# Patient Record
Sex: FEMALE | Race: WHITE | URBAN - METROPOLITAN AREA
[De-identification: names, ages, dates, MRNs, and addresses within clinical notes are randomized per-mention and may not be internally consistent; named-entity substitution may affect disease eponyms.]

---

## 2020-08-24 ENCOUNTER — OFFICE VISIT (OUTPATIENT)
Dept: URBAN - METROPOLITAN AREA CLINIC 54 | Facility: CLINIC | Age: 75
End: 2020-08-24
Payer: MEDICARE

## 2020-08-24 DIAGNOSIS — K74.60 CIRRHOSIS: ICD-10-CM

## 2020-08-24 DIAGNOSIS — K76.0 NAFLD (NONALCOHOLIC FATTY LIVER DISEASE): ICD-10-CM

## 2020-08-24 DIAGNOSIS — E88.81 METABOLIC SYNDROME: ICD-10-CM

## 2020-08-24 PROCEDURE — 99214 OFFICE O/P EST MOD 30 MIN: CPT | Performed by: INTERNAL MEDICINE

## 2020-08-24 RX ORDER — METOPROLOL SUCCINATE 100 MG/1
TABLET, EXTENDED RELEASE ORAL
Qty: 0 | Refills: 0 | Status: ACTIVE | COMMUNITY
Start: 2017-09-06

## 2020-08-24 RX ORDER — AMLODIPINE BESYLATE 5 MG/1
TABLET ORAL
Qty: 0 | Refills: 0 | Status: ACTIVE | COMMUNITY
Start: 2017-07-24

## 2020-08-24 RX ORDER — CHLORTHALIDONE 25 MG/1
TABLET ORAL
Qty: 0 | Refills: 0 | Status: ACTIVE | COMMUNITY
Start: 2017-08-18

## 2020-08-24 NOTE — HPI-TODAY'S VISIT:
Patient is a 76 yo woman who presents for transition of care. She has established NAFLD Cirrhosis since 2017 based on liver biopsy, imaging and lab profile. She has compensated disease without portal hypertension. She denies signs/symptoms of advanced liver disease. She has DM, HTN, HLD. Obesity and the metabolic syndrome. Also has CKD-III. MRI Aug 2020 confirms cirrhosis without HCC or PVT. Last MELD was 9. She has never been evalauted for OLT due to age. No tobacco or alcohol abuse. Last EGD was 2017 with no varices. Her last colonoscopy was done at [facility] around 5 years with no polyps removed. No family history of GI malignancies.

## 2021-02-22 ENCOUNTER — OFFICE VISIT (OUTPATIENT)
Dept: URBAN - METROPOLITAN AREA CLINIC 54 | Facility: CLINIC | Age: 76
End: 2021-02-22

## 2021-02-22 RX ORDER — METOPROLOL SUCCINATE 100 MG/1
TABLET, EXTENDED RELEASE ORAL
Qty: 0 | Refills: 0 | Status: ACTIVE | COMMUNITY
Start: 2017-09-06

## 2021-02-22 RX ORDER — CHLORTHALIDONE 25 MG/1
TABLET ORAL
Qty: 0 | Refills: 0 | Status: ACTIVE | COMMUNITY
Start: 2017-08-18

## 2021-02-22 RX ORDER — AMLODIPINE BESYLATE 5 MG/1
TABLET ORAL
Qty: 0 | Refills: 0 | Status: ACTIVE | COMMUNITY
Start: 2017-07-24

## 2021-04-01 ENCOUNTER — OFFICE VISIT (OUTPATIENT)
Dept: URBAN - METROPOLITAN AREA CLINIC 54 | Facility: CLINIC | Age: 76
End: 2021-04-01
Payer: MEDICARE

## 2021-04-01 ENCOUNTER — TELEPHONE ENCOUNTER (OUTPATIENT)
Dept: URBAN - METROPOLITAN AREA CLINIC 54 | Facility: CLINIC | Age: 76
End: 2021-04-01

## 2021-04-01 DIAGNOSIS — N18.9 CKD (CHRONIC KIDNEY DISEASE): ICD-10-CM

## 2021-04-01 DIAGNOSIS — E88.81 METABOLIC SYNDROME: ICD-10-CM

## 2021-04-01 DIAGNOSIS — K74.60: ICD-10-CM

## 2021-04-01 DIAGNOSIS — K75.81 NASH (NONALCOHOLIC STEATOHEPATITIS): ICD-10-CM

## 2021-04-01 DIAGNOSIS — E66.9 OBESITY (BMI 30-39.9): ICD-10-CM

## 2021-04-01 PROCEDURE — 99213 OFFICE O/P EST LOW 20 MIN: CPT | Performed by: INTERNAL MEDICINE

## 2021-04-01 RX ORDER — AMLODIPINE BESYLATE 5 MG/1
TABLET ORAL
Qty: 0 | Refills: 0 | Status: ACTIVE | COMMUNITY
Start: 2017-07-24

## 2021-04-01 RX ORDER — METOPROLOL SUCCINATE 100 MG/1
TABLET, EXTENDED RELEASE ORAL
Qty: 0 | Refills: 0 | Status: ACTIVE | COMMUNITY
Start: 2017-09-06

## 2021-04-01 RX ORDER — CHLORTHALIDONE 25 MG/1
TABLET ORAL
Qty: 0 | Refills: 0 | Status: ACTIVE | COMMUNITY
Start: 2017-08-18

## 2021-04-01 RX ORDER — CLONIDINE HYDROCHLORIDE 0.1 MG/1
1 TABLET TABLET ORAL BID
Qty: 60 TABLET | OUTPATIENT
Start: 2021-04-01

## 2021-04-01 NOTE — HPI-TODAY'S VISIT:
Patient is a 76 yo woman who presents for transition of care. She has established NAFLD Cirrhosis since 2017 based on liver biopsy, imaging and lab profile. She has compensated disease without portal hypertension. She denies signs/symptoms of advanced liver disease. She has DM, HTN, HLD. Obesity and the metabolic syndrome. Also has CKD-III. MRI Aug 2020 confirms cirrhosis without HCC or PVT. Last MELD was 9. She has never been evalauted for OLT due to age. No tobacco or alcohol abuse. Last EGD was 2017 with no varices. Her last colonoscopy was done at [facility] around 5 years with no polyps removed. No family history of GI malignancies.  Follow Up 4/1/21: Pateint presents for routine follow up. No liver related complaints over past 6 months. She is euvolemic with no signs of declining liver function. 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 Schaffer. She is due for HCC Screen. LFT's normal at last check. DM is poorly controlled. She reports a last HBA1C was in 7% range as per range.

## 2021-04-02 LAB
A/G RATIO: 1.7
AFP, SERUM, TUMOR MARKER: 8.3
ALBUMIN: 4
ALKALINE PHOSPHATASE: 62
ALT (SGPT): 14
AST (SGOT): 28
BASO (ABSOLUTE): 0
BASOS: 1
BILIRUBIN, TOTAL: 0.5
BUN/CREATININE RATIO: 13
BUN: 27
CALCIUM: 9.2
CARBON DIOXIDE, TOTAL: 23
CHLORIDE: 102
CREATININE: 2.09
EGFR IF AFRICN AM: 26
EGFR IF NONAFRICN AM: 22
EOS (ABSOLUTE): 0.2
EOS: 4
GLOBULIN, TOTAL: 2.3
GLUCOSE: 214
HEMATOCRIT: 29.3
HEMATOLOGY COMMENTS:: (no result)
HEMOGLOBIN: 9.3
IMMATURE CELLS: (no result)
IMMATURE GRANS (ABS): 0
IMMATURE GRANULOCYTES: 0
INR: 1.1
LYMPHS (ABSOLUTE): 1.8
LYMPHS: 32
MCH: 27.5
MCHC: 31.7
MCV: 87
MONOCYTES(ABSOLUTE): 0.5
MONOCYTES: 8
NEUTROPHILS (ABSOLUTE): 3.2
NEUTROPHILS: 55
NRBC: (no result)
PLATELETS: 176
POTASSIUM: 3.8
PROTEIN, TOTAL: 6.3
PROTHROMBIN TIME: 12
RBC: 3.38
RDW: 13.6
SODIUM: 138
WBC: 5.8

## 2021-04-09 ENCOUNTER — OFFICE VISIT (OUTPATIENT)
Dept: URBAN - METROPOLITAN AREA CLINIC 53 | Facility: CLINIC | Age: 76
End: 2021-04-09
Payer: MEDICARE

## 2021-04-09 DIAGNOSIS — N28.1 RIGHT RENAL CYST: ICD-10-CM

## 2021-04-09 DIAGNOSIS — K75.81 NASH (NONALCOHOLIC STEATOHEPATITIS): ICD-10-CM

## 2021-04-09 DIAGNOSIS — K74.69 CIRRHOSIS, CRYPTOGENIC: ICD-10-CM

## 2021-04-09 PROCEDURE — 76705 ECHO EXAM OF ABDOMEN: CPT | Performed by: INTERNAL MEDICINE

## 2021-04-09 PROCEDURE — 93975 VASCULAR STUDY: CPT | Performed by: INTERNAL MEDICINE

## 2021-04-09 RX ORDER — CLONIDINE HYDROCHLORIDE 0.1 MG/1
1 TABLET TABLET ORAL BID
Qty: 60 TABLET | Status: ACTIVE | COMMUNITY
Start: 2021-04-01

## 2021-04-09 RX ORDER — CHLORTHALIDONE 25 MG/1
TABLET ORAL
Qty: 0 | Refills: 0 | Status: ACTIVE | COMMUNITY
Start: 2017-08-18

## 2021-04-09 RX ORDER — METOPROLOL SUCCINATE 100 MG/1
TABLET, EXTENDED RELEASE ORAL
Qty: 0 | Refills: 0 | Status: ACTIVE | COMMUNITY
Start: 2017-09-06

## 2021-04-09 RX ORDER — AMLODIPINE BESYLATE 5 MG/1
TABLET ORAL
Qty: 0 | Refills: 0 | Status: ACTIVE | COMMUNITY
Start: 2017-07-24

## 2021-09-09 ENCOUNTER — OFFICE VISIT (OUTPATIENT)
Dept: URBAN - METROPOLITAN AREA CLINIC 54 | Facility: CLINIC | Age: 76
End: 2021-09-09

## 2021-09-09 RX ORDER — CHLORTHALIDONE 25 MG/1
TABLET ORAL
Qty: 0 | Refills: 0 | Status: ACTIVE | COMMUNITY
Start: 2017-08-18

## 2021-09-09 RX ORDER — METOPROLOL SUCCINATE 100 MG/1
TABLET, EXTENDED RELEASE ORAL
Qty: 0 | Refills: 0 | Status: ACTIVE | COMMUNITY
Start: 2017-09-06

## 2021-09-09 RX ORDER — AMLODIPINE BESYLATE 5 MG/1
TABLET ORAL
Qty: 0 | Refills: 0 | Status: ACTIVE | COMMUNITY
Start: 2017-07-24

## 2021-09-09 RX ORDER — CLONIDINE HYDROCHLORIDE 0.1 MG/1
1 TABLET TABLET ORAL BID
Qty: 60 TABLET | Status: ACTIVE | COMMUNITY
Start: 2021-04-01

## 2021-11-19 ENCOUNTER — OFFICE VISIT (OUTPATIENT)
Dept: URBAN - METROPOLITAN AREA CLINIC 54 | Facility: CLINIC | Age: 76
End: 2021-11-19
Payer: MEDICARE

## 2021-11-19 ENCOUNTER — WEB ENCOUNTER (OUTPATIENT)
Dept: URBAN - METROPOLITAN AREA CLINIC 54 | Facility: CLINIC | Age: 76
End: 2021-11-19

## 2021-11-19 DIAGNOSIS — D64.9 ANEMIA, UNSPECIFIED TYPE: ICD-10-CM

## 2021-11-19 DIAGNOSIS — E66.9 OBESITY (BMI 30-39.9): ICD-10-CM

## 2021-11-19 DIAGNOSIS — E88.81 METABOLIC SYNDROME: ICD-10-CM

## 2021-11-19 DIAGNOSIS — N18.9 CKD (CHRONIC KIDNEY DISEASE): ICD-10-CM

## 2021-11-19 DIAGNOSIS — K74.60: ICD-10-CM

## 2021-11-19 DIAGNOSIS — K75.81 NASH (NONALCOHOLIC STEATOHEPATITIS): ICD-10-CM

## 2021-11-19 PROCEDURE — 99213 OFFICE O/P EST LOW 20 MIN: CPT | Performed by: INTERNAL MEDICINE

## 2021-11-19 RX ORDER — METOPROLOL SUCCINATE 100 MG/1
TABLET, EXTENDED RELEASE ORAL
Qty: 0 | Refills: 0 | Status: ACTIVE | COMMUNITY
Start: 2017-09-06

## 2021-11-19 RX ORDER — ROSUVASTATIN CALCIUM 40 MG/1
1 TABLET TABLET, FILM COATED ORAL ONCE A DAY
Refills: 0 | Status: ACTIVE | COMMUNITY
Start: 1900-01-01

## 2021-11-19 RX ORDER — VALSARTAN 160 MG/1
1 TABLET TABLET, FILM COATED ORAL TWICE A DAY
Refills: 0 | Status: DISCONTINUED | COMMUNITY
Start: 1900-01-01

## 2021-11-19 RX ORDER — CLONIDINE HYDROCHLORIDE 0.1 MG/1
1 TABLET TABLET ORAL BID
Qty: 60 TABLET | Status: ACTIVE | COMMUNITY
Start: 2021-04-01

## 2021-11-19 RX ORDER — AMLODIPINE BESYLATE 5 MG/1
TABLET ORAL
Qty: 0 | Refills: 0 | Status: ACTIVE | COMMUNITY
Start: 2017-07-24

## 2021-11-19 RX ORDER — CHLORTHALIDONE 25 MG/1
TABLET ORAL
Qty: 0 | Refills: 0 | Status: ACTIVE | COMMUNITY
Start: 2017-08-18

## 2021-11-19 NOTE — PHYSICAL EXAM NEUROLOGIC:
Spoke with patient and she stated that she is uneligible for the moderna and pfizer covid vaccine per her pain doctor. She wanted Dr. Abdalla's advise as if she can take the J&J vaccine as she has hx of blood clots. Forwarded message to Dr. Abdalla. Informed pt that will call back with his recommendations. Pt verbalized understanding.    oriented to person, place and time , normal sensation , short and long term memory intact

## 2021-11-19 NOTE — HPI-TODAY'S VISIT:
Patient is a 76 yo woman who presents for transition of care. She has established NAFLD Cirrhosis since 2017 based on liver biopsy, imaging and lab profile. She has compensated disease without portal hypertension. She denies signs/symptoms of advanced liver disease. She has DM, HTN, HLD. Obesity and the metabolic syndrome. Also has CKD-III. MRI Aug 2020 confirms cirrhosis without HCC or PVT. Last MELD was 9. She has never been evalauted for OLT due to age. No tobacco or alcohol abuse. Last EGD was 2017 with no varices. Her last colonoscopy was done at [facility] around 5 years with no polyps removed. No family history of GI malignancies.  Follow Up 4/1/21: Pateint presents for routine follow up. No liver related complaints over past 6 months. She is euvolemic with no signs of declining liver function. 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 Schaffer. She is due for HCC Screen. LFT's normal at last check. DM is poorly controlled. She reports a last HBA1C was in 7% range as per range.  Follow Up 11/19/21: Patient presents for routine follow up. MELD noted at 14 on last draw (driven by Cr). No liver related complaints. Hb noted at 9.3. She was given iron by primary MD which improved it to 12.1. She appears euvolemic with no signs of GIB or PSE. Appetite and sleep are good. HCC screen negative April 2021.

## 2021-11-20 LAB
A/G RATIO: 2
AFP, SERUM, TUMOR MARKER: 10.5
ALBUMIN: 4.6
ALKALINE PHOSPHATASE: 94
ALT (SGPT): 25
AST (SGOT): 34
BASO (ABSOLUTE): 0.1
BASOS: 1
BILIRUBIN, TOTAL: 0.8
BUN/CREATININE RATIO: 11
BUN: 16
CALCIUM: 9.8
CARBON DIOXIDE, TOTAL: 24
CHLORIDE: 103
CREATININE: 1.43
EGFR IF AFRICN AM: 41
EGFR IF NONAFRICN AM: 35
EOS (ABSOLUTE): 0.2
EOS: 3
FERRITIN, SERUM: 89
GLOBULIN, TOTAL: 2.3
GLUCOSE: 179
HEMATOCRIT: 40.7
HEMATOLOGY COMMENTS:: (no result)
HEMOGLOBIN A1C: 6.7
HEMOGLOBIN: 13.1
IMMATURE CELLS: (no result)
IMMATURE GRANS (ABS): 0
IMMATURE GRANULOCYTES: 0
INR: 1
IRON BIND.CAP.(TIBC): 321
IRON SATURATION: 26
IRON: 84
LYMPHS (ABSOLUTE): 2
LYMPHS: 34
MCH: 29
MCHC: 32.2
MCV: 90
MONOCYTES(ABSOLUTE): 0.4
MONOCYTES: 7
NEUTROPHILS (ABSOLUTE): 3.3
NEUTROPHILS: 55
NRBC: (no result)
PLATELETS: 186
POTASSIUM: 4.2
PROTEIN, TOTAL: 6.9
PROTHROMBIN TIME: 10.9
RBC: 4.52
RDW: 13.1
SODIUM: 139
UIBC: 237
WBC: 6

## 2021-11-22 ENCOUNTER — TELEPHONE ENCOUNTER (OUTPATIENT)
Dept: URBAN - METROPOLITAN AREA CLINIC 92 | Facility: CLINIC | Age: 76
End: 2021-11-22

## 2021-11-22 ENCOUNTER — TELEPHONE ENCOUNTER (OUTPATIENT)
Dept: URBAN - METROPOLITAN AREA CLINIC 54 | Facility: CLINIC | Age: 76
End: 2021-11-22

## 2021-12-01 ENCOUNTER — OFFICE VISIT (OUTPATIENT)
Dept: URBAN - METROPOLITAN AREA CLINIC 53 | Facility: CLINIC | Age: 76
End: 2021-12-01

## 2022-05-20 ENCOUNTER — OFFICE VISIT (OUTPATIENT)
Dept: URBAN - METROPOLITAN AREA CLINIC 54 | Facility: CLINIC | Age: 77
End: 2022-05-20

## 2022-05-27 ENCOUNTER — OFFICE VISIT (OUTPATIENT)
Dept: URBAN - METROPOLITAN AREA CLINIC 54 | Facility: CLINIC | Age: 77
End: 2022-05-27

## 2022-05-27 RX ORDER — AMLODIPINE BESYLATE 5 MG/1
TABLET ORAL
Qty: 0 | Refills: 0 | COMMUNITY
Start: 2017-07-24

## 2022-05-27 RX ORDER — ROSUVASTATIN CALCIUM 40 MG/1
1 TABLET TABLET, FILM COATED ORAL ONCE A DAY
Refills: 0 | COMMUNITY
Start: 1900-01-01

## 2022-05-27 RX ORDER — CHLORTHALIDONE 25 MG/1
TABLET ORAL
Qty: 0 | Refills: 0 | COMMUNITY
Start: 2017-08-18

## 2022-05-27 RX ORDER — METOPROLOL SUCCINATE 100 MG/1
TABLET, EXTENDED RELEASE ORAL
Qty: 0 | Refills: 0 | COMMUNITY
Start: 2017-09-06

## 2022-05-27 RX ORDER — CLONIDINE HYDROCHLORIDE 0.1 MG/1
1 TABLET TABLET ORAL BID
Qty: 60 TABLET | COMMUNITY
Start: 2021-04-01

## 2022-06-24 ENCOUNTER — OFFICE VISIT (OUTPATIENT)
Dept: URBAN - METROPOLITAN AREA CLINIC 54 | Facility: CLINIC | Age: 77
End: 2022-06-24
Payer: MEDICARE

## 2022-06-24 VITALS
SYSTOLIC BLOOD PRESSURE: 130 MMHG | TEMPERATURE: 97.3 F | DIASTOLIC BLOOD PRESSURE: 77 MMHG | BODY MASS INDEX: 31.21 KG/M2 | HEIGHT: 64 IN | WEIGHT: 182.8 LBS | HEART RATE: 65 BPM

## 2022-06-24 DIAGNOSIS — E66.9 OBESITY (BMI 30-39.9): ICD-10-CM

## 2022-06-24 DIAGNOSIS — E88.81 METABOLIC SYNDROME: ICD-10-CM

## 2022-06-24 DIAGNOSIS — K74.60: ICD-10-CM

## 2022-06-24 DIAGNOSIS — D64.9 ANEMIA, UNSPECIFIED TYPE: ICD-10-CM

## 2022-06-24 DIAGNOSIS — K75.81 NASH (NONALCOHOLIC STEATOHEPATITIS): ICD-10-CM

## 2022-06-24 DIAGNOSIS — N18.9 CKD (CHRONIC KIDNEY DISEASE): ICD-10-CM

## 2022-06-24 PROCEDURE — 99213 OFFICE O/P EST LOW 20 MIN: CPT | Performed by: INTERNAL MEDICINE

## 2022-06-24 RX ORDER — ERGOCALCIFEROL CAPSULES, 1.25 MG/1
1 CAPSULE CAPSULE ORAL
Status: ACTIVE | COMMUNITY

## 2022-06-24 RX ORDER — METOPROLOL SUCCINATE 100 MG/1
TABLET, EXTENDED RELEASE ORAL
Qty: 0 | Refills: 0 | Status: ACTIVE | COMMUNITY
Start: 2017-09-06

## 2022-06-24 RX ORDER — AMLODIPINE BESYLATE 5 MG/1
TABLET ORAL
Qty: 0 | Refills: 0 | Status: ACTIVE | COMMUNITY
Start: 2017-07-24

## 2022-06-24 RX ORDER — FERROUS SULFATE 325(65) MG
1 TABLET TABLET ORAL ONCE A DAY
Status: ACTIVE | COMMUNITY

## 2022-06-24 RX ORDER — CLONIDINE HYDROCHLORIDE 0.1 MG/1
1 TABLET TABLET ORAL BID
Qty: 60 TABLET | Status: ACTIVE | COMMUNITY
Start: 2021-04-01

## 2022-06-24 RX ORDER — ROSUVASTATIN CALCIUM 40 MG/1
1 TABLET TABLET, FILM COATED ORAL ONCE A DAY
Refills: 0 | Status: ACTIVE | COMMUNITY
Start: 1900-01-01

## 2022-06-24 RX ORDER — CHLORTHALIDONE 25 MG/1
TABLET ORAL
Qty: 0 | Refills: 0 | Status: ACTIVE | COMMUNITY
Start: 2017-08-18

## 2022-06-24 RX ORDER — CHLORPHENIRAMINE MALEATE 4 MG/1
1 TABLET AS NEEDED TABLET ORAL
Status: ACTIVE | COMMUNITY

## 2022-06-24 RX ORDER — FAMOTIDINE 20 MG/1
1 TABLET AT BEDTIME AS NEEDED TABLET, FILM COATED ORAL ONCE A DAY
Status: ACTIVE | COMMUNITY

## 2022-06-24 NOTE — HPI-TODAY'S VISIT:
Patient is a 76 yo woman who presents for transition of care. She has established NAFLD Cirrhosis since 2017 based on liver biopsy, imaging and lab profile. She has compensated disease without portal hypertension. She denies signs/symptoms of advanced liver disease. She has DM, HTN, HLD. Obesity and the metabolic syndrome. Also has CKD-III. MRI Aug 2020 confirms cirrhosis without HCC or PVT. Last MELD was 9. She has never been evalauted for OLT due to age. No tobacco or alcohol abuse. Last EGD was 2017 with no varices. Her last colonoscopy was done at [facility] around 5 years with no polyps removed. No family history of GI malignancies.  Follow Up 4/1/21: Pateint presents for routine follow up. No liver related complaints over past 6 months. She is euvolemic with no signs of declining liver function. 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 Schaffer. She is due for HCC Screen. LFT's normal at last check. DM is poorly controlled. She reports a last HBA1C was in 7% range as per range.  Follow Up 11/19/21: Patient presents for routine follow up. MELD noted at 14 on last draw (driven by Cr). No liver related complaints. Hb noted at 9.3. She was given iron by primary MD which improved it to 12.1. She appears euvolemic with no signs of GIB or PSE. Appetite and sleep are good. HCC screen negative April 2021.  Follow Up 6/24/22: Patient presents for routine follow up. No liver related complaints. No hospitalizations. MRI in Nov 2021 negative for HCC (AFP marginally high at 10). No signs of portal hypertension. She has lost 6 lbs with dietary adjustments.

## 2022-06-26 ENCOUNTER — TELEPHONE ENCOUNTER (OUTPATIENT)
Dept: URBAN - METROPOLITAN AREA CLINIC 92 | Facility: CLINIC | Age: 77
End: 2022-06-26

## 2022-06-26 PROBLEM — 19943007: Status: ACTIVE | Noted: 2022-06-26

## 2022-06-26 LAB
A/G RATIO: 2.1
AFP, SERUM, TUMOR MARKER: 11.3
ALBUMIN: 4.5
ALKALINE PHOSPHATASE: 86
ALT (SGPT): 16
AST (SGOT): 29
BASO (ABSOLUTE): (no result)
BASOS: (no result)
BILIRUBIN, TOTAL: 0.5
BUN/CREATININE RATIO: 12
BUN: 19
CALCIUM: 9.3
CARBON DIOXIDE, TOTAL: 20
CHLORIDE: 105
CREATININE: 1.62
EGFR: 32
EOS (ABSOLUTE): (no result)
EOS: (no result)
GLOBULIN, TOTAL: 2.1
GLUCOSE: 98
HEMATOCRIT: (no result)
HEMATOLOGY COMMENTS:: (no result)
HEMOGLOBIN: (no result)
IMMATURE CELLS: (no result)
IMMATURE GRANS (ABS): (no result)
IMMATURE GRANULOCYTES: (no result)
INR: 1
LYMPHS (ABSOLUTE): (no result)
LYMPHS: (no result)
MCH: (no result)
MCHC: (no result)
MCV: (no result)
MONOCYTES(ABSOLUTE): (no result)
MONOCYTES: (no result)
NEUTROPHILS (ABSOLUTE): (no result)
NEUTROPHILS: (no result)
NRBC: (no result)
PLATELETS: (no result)
POTASSIUM: 4.2
PROTEIN, TOTAL: 6.6
PROTHROMBIN TIME: 10.8
RBC: (no result)
RDW: (no result)
REQUEST PROBLEM: (no result)
SODIUM: 144
WBC: (no result)

## 2022-06-29 ENCOUNTER — OFFICE VISIT (OUTPATIENT)
Dept: URBAN - METROPOLITAN AREA CLINIC 53 | Facility: CLINIC | Age: 77
End: 2022-06-29

## 2022-07-05 ENCOUNTER — LAB OUTSIDE AN ENCOUNTER (OUTPATIENT)
Dept: URBAN - METROPOLITAN AREA CLINIC 54 | Facility: CLINIC | Age: 77
End: 2022-07-05

## 2022-07-05 LAB
CREATININE POC: 1.3
PERFORMING LAB: (no result)

## 2023-02-22 ENCOUNTER — OFFICE VISIT (OUTPATIENT)
Dept: URBAN - NONMETROPOLITAN AREA CLINIC 4 | Facility: CLINIC | Age: 78
End: 2023-02-22
Payer: MEDICARE

## 2023-02-22 VITALS
TEMPERATURE: 97.7 F | WEIGHT: 175 LBS | DIASTOLIC BLOOD PRESSURE: 64 MMHG | BODY MASS INDEX: 29.88 KG/M2 | HEIGHT: 64 IN | SYSTOLIC BLOOD PRESSURE: 136 MMHG | HEART RATE: 67 BPM

## 2023-02-22 DIAGNOSIS — K74.60: ICD-10-CM

## 2023-02-22 DIAGNOSIS — Z12.11 ENCOUNTER FOR SCREENING COLONOSCOPY: ICD-10-CM

## 2023-02-22 DIAGNOSIS — D64.9 ANEMIA, UNSPECIFIED TYPE: ICD-10-CM

## 2023-02-22 DIAGNOSIS — E66.9 OBESITY (BMI 30-39.9): ICD-10-CM

## 2023-02-22 DIAGNOSIS — K75.81 NASH (NONALCOHOLIC STEATOHEPATITIS): ICD-10-CM

## 2023-02-22 DIAGNOSIS — N18.9 CKD (CHRONIC KIDNEY DISEASE): ICD-10-CM

## 2023-02-22 DIAGNOSIS — E88.81 METABOLIC SYNDROME: ICD-10-CM

## 2023-02-22 PROBLEM — 237602007 METABOLIC SYNDROME: Status: ACTIVE | Noted: 2020-08-24

## 2023-02-22 PROCEDURE — 99214 OFFICE O/P EST MOD 30 MIN: CPT | Performed by: INTERNAL MEDICINE

## 2023-02-22 RX ORDER — FAMOTIDINE 20 MG/1
1 TABLET AT BEDTIME AS NEEDED TABLET, FILM COATED ORAL ONCE A DAY
Status: ACTIVE | COMMUNITY

## 2023-02-22 RX ORDER — CLONIDINE HYDROCHLORIDE 0.1 MG/1
1 TABLET TABLET ORAL BID
Qty: 60 TABLET | Status: ACTIVE | COMMUNITY
Start: 2021-04-01

## 2023-02-22 RX ORDER — AMLODIPINE BESYLATE 5 MG/1
TABLET ORAL
Qty: 0 | Refills: 0 | Status: DISCONTINUED | COMMUNITY
Start: 2017-07-24

## 2023-02-22 RX ORDER — FERROUS SULFATE 325(65) MG
1 TABLET TABLET ORAL ONCE A DAY
Status: DISCONTINUED | COMMUNITY

## 2023-02-22 RX ORDER — ERGOCALCIFEROL CAPSULES, 1.25 MG/1
1 CAPSULE CAPSULE ORAL
Status: DISCONTINUED | COMMUNITY

## 2023-02-22 RX ORDER — CHLORPHENIRAMINE MALEATE 4 MG/1
1 TABLET AS NEEDED TABLET ORAL
Status: ACTIVE | COMMUNITY

## 2023-02-22 RX ORDER — ROSUVASTATIN CALCIUM 40 MG/1
1 TABLET TABLET, FILM COATED ORAL ONCE A DAY
Status: DISCONTINUED | COMMUNITY

## 2023-02-22 RX ORDER — SODIUM SULFATE, MAGNESIUM SULFATE, AND POTASSIUM CHLORIDE 17.75; 2.7; 2.25 G/1; G/1; G/1
12 TABLETS TABLET ORAL
Qty: 24 TABLETS | Refills: 0 | OUTPATIENT
Start: 2023-02-22 | End: 2023-02-23

## 2023-02-22 RX ORDER — METOPROLOL SUCCINATE 100 MG/1
TABLET, EXTENDED RELEASE ORAL
Qty: 0 | Refills: 0 | Status: ACTIVE | COMMUNITY
Start: 2017-09-06

## 2023-02-22 RX ORDER — ACETAMINOPHEN 325 MG
1 TABLET AS NEEDED TABLET ORAL
Status: DISCONTINUED | COMMUNITY

## 2023-02-22 NOTE — HPI-TODAY'S VISIT:
Patient is a 76 yo woman who presents for transition of care. She has established NAFLD Cirrhosis since 2017 based on liver biopsy, imaging and lab profile. She has compensated disease without portal hypertension. She denies signs/symptoms of advanced liver disease. She has DM, HTN, HLD. Obesity and the metabolic syndrome. Also has CKD-III. MRI Aug 2020 confirms cirrhosis without HCC or PVT. Last MELD was 9. She has never been evalauted for OLT due to age. No tobacco or alcohol abuse. Last EGD was 2017 with no varices. Her last colonoscopy was done at [facility] around 5 years with no polyps removed. No family history of GI malignancies.  Follow Up 4/1/21: Pateint presents for routine follow up. No liver related complaints over past 6 months. She is euvolemic with no signs of declining liver function. 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 Schaffer. She is due for HCC Screen. LFT's normal at last check. DM is poorly controlled. She reports a last HBA1C was in 7% range as per range.  Follow Up 11/19/21: Patient presents for routine follow up. MELD noted at 14 on last draw (driven by Cr). No liver related complaints. Hb noted at 9.3. She was given iron by primary MD which improved it to 12.1. She appears euvolemic with no signs of GIB or PSE. Appetite and sleep are good. HCC screen negative April 2021.  Follow Up 6/24/22: Patient presents for routine follow up. No liver related complaints. No hospitalizations. MRI in Nov 2021 negative for HCC (AFP marginally high at 10). No signs of portal hypertension. She has lost 6 lbs with dietary adjustments.  Follow Up 2/22/23: Patient presents for routine follow up. No liver related complaints. She c/o heartburn at night time. She takes Famotidine 20 mg BID and has to need TUMS for breakthrough symptoms two nights a week. Last EGD in 2017 showed a small hiatal hernia. Appetite is OK. No major issues with sleep. HCC screen negative July 2022.

## 2023-02-23 LAB
A/G RATIO: 1.7
ABSOLUTE BASOPHILS: 50
ABSOLUTE EOSINOPHILS: 122
ABSOLUTE LYMPHOCYTES: 1243
ABSOLUTE MONOCYTES: 353
ABSOLUTE NEUTROPHILS: 2432
AFP, SERUM, TUMOR MARKER: 9.2
ALBUMIN: 4
ALKALINE PHOSPHATASE: 73
ALT (SGPT): 27
AST (SGOT): 39
BASOPHILS: 1.2
BILIRUBIN, TOTAL: 0.8
BUN/CREATININE RATIO: 15
BUN: 18
CALCIUM: 9
CARBON DIOXIDE, TOTAL: 24
CHLORIDE: 105
CREATININE: 1.21
EGFR: 46
EOSINOPHILS: 2.9
GLOBULIN, TOTAL: 2.3
GLUCOSE: 123
HEMATOCRIT: 35.5
HEMOGLOBIN: 11.7
INR: 1
LYMPHOCYTES: 29.6
MCH: 28.1
MCHC: 33
MCV: 85.3
MONOCYTES: 8.4
MPV: 12.9
NEUTROPHILS: 57.9
PLATELET COUNT: 134
POTASSIUM: 3.9
PROTEIN, TOTAL: 6.3
PT: 10.5
RDW: 13.4
RED BLOOD CELL COUNT: 4.16
SODIUM: 141
WHITE BLOOD CELL COUNT: 4.2

## 2023-03-06 ENCOUNTER — TELEPHONE ENCOUNTER (OUTPATIENT)
Dept: URBAN - NONMETROPOLITAN AREA CLINIC 4 | Facility: CLINIC | Age: 78
End: 2023-03-06

## 2023-04-06 ENCOUNTER — OFFICE VISIT (OUTPATIENT)
Dept: URBAN - METROPOLITAN AREA MEDICAL CENTER 23 | Facility: MEDICAL CENTER | Age: 78
End: 2023-04-06

## 2023-06-01 ENCOUNTER — OFFICE VISIT (OUTPATIENT)
Dept: URBAN - METROPOLITAN AREA MEDICAL CENTER 23 | Facility: MEDICAL CENTER | Age: 78
End: 2023-06-01
Payer: MEDICARE

## 2023-06-01 DIAGNOSIS — D12.3 ADENOMA OF TRANSVERSE COLON: ICD-10-CM

## 2023-06-01 DIAGNOSIS — Z12.11 COLON CANCER SCREENING: ICD-10-CM

## 2023-06-01 DIAGNOSIS — K29.60 ADENOPAPILLOMATOSIS GASTRICA: ICD-10-CM

## 2023-06-01 DIAGNOSIS — K74.69 CIRRHOSIS, CRYPTOGENIC: ICD-10-CM

## 2023-06-01 DIAGNOSIS — D12.0 ADENOMA OF CECUM: ICD-10-CM

## 2023-06-01 PROCEDURE — 43239 EGD BIOPSY SINGLE/MULTIPLE: CPT | Performed by: INTERNAL MEDICINE

## 2023-06-01 PROCEDURE — 45385 COLONOSCOPY W/LESION REMOVAL: CPT | Performed by: INTERNAL MEDICINE

## 2023-06-01 RX ORDER — CHLORPHENIRAMINE MALEATE 4 MG/1
1 TABLET AS NEEDED TABLET ORAL
Status: ACTIVE | COMMUNITY

## 2023-06-01 RX ORDER — CLONIDINE HYDROCHLORIDE 0.1 MG/1
1 TABLET TABLET ORAL BID
Qty: 60 TABLET | Status: ACTIVE | COMMUNITY
Start: 2021-04-01

## 2023-06-01 RX ORDER — METOPROLOL SUCCINATE 100 MG/1
TABLET, EXTENDED RELEASE ORAL
Qty: 0 | Refills: 0 | Status: ACTIVE | COMMUNITY
Start: 2017-09-06

## 2023-06-01 RX ORDER — FAMOTIDINE 20 MG/1
1 TABLET AT BEDTIME AS NEEDED TABLET, FILM COATED ORAL ONCE A DAY
Status: ACTIVE | COMMUNITY

## 2023-08-09 ENCOUNTER — OFFICE VISIT (OUTPATIENT)
Dept: URBAN - NONMETROPOLITAN AREA CLINIC 4 | Facility: CLINIC | Age: 78
End: 2023-08-09
Payer: MEDICARE

## 2023-08-09 ENCOUNTER — LAB OUTSIDE AN ENCOUNTER (OUTPATIENT)
Dept: URBAN - NONMETROPOLITAN AREA CLINIC 4 | Facility: CLINIC | Age: 78
End: 2023-08-09

## 2023-08-09 ENCOUNTER — DASHBOARD ENCOUNTERS (OUTPATIENT)
Age: 78
End: 2023-08-09

## 2023-08-09 VITALS
SYSTOLIC BLOOD PRESSURE: 132 MMHG | BODY MASS INDEX: 28.85 KG/M2 | WEIGHT: 169 LBS | HEIGHT: 64 IN | DIASTOLIC BLOOD PRESSURE: 53 MMHG | TEMPERATURE: 97.5 F | HEART RATE: 53 BPM

## 2023-08-09 DIAGNOSIS — K75.81 CHRONIC STEATOHEPATITIS, NONALCOHOLIC: ICD-10-CM

## 2023-08-09 DIAGNOSIS — K74.60: ICD-10-CM

## 2023-08-09 DIAGNOSIS — D64.9 ABSOLUTE ANEMIA: ICD-10-CM

## 2023-08-09 DIAGNOSIS — I85.00 ESOPHAGEAL VARICES DETERMINED BY ENDOSCOPY: ICD-10-CM

## 2023-08-09 PROBLEM — 305058001: Status: ACTIVE | Noted: 2023-03-21

## 2023-08-09 PROBLEM — 28670008: Status: ACTIVE | Noted: 2023-08-09

## 2023-08-09 PROCEDURE — 99214 OFFICE O/P EST MOD 30 MIN: CPT | Performed by: INTERNAL MEDICINE

## 2023-08-09 RX ORDER — CLONIDINE HYDROCHLORIDE 0.1 MG/1
1 TABLET TABLET ORAL BID
Qty: 60 TABLET | Status: ACTIVE | COMMUNITY
Start: 2021-04-01

## 2023-08-09 RX ORDER — FAMOTIDINE 20 MG/1
1 TABLET AT BEDTIME AS NEEDED TABLET, FILM COATED ORAL ONCE A DAY
Status: ACTIVE | COMMUNITY

## 2023-08-09 RX ORDER — METOPROLOL SUCCINATE 100 MG/1
TABLET, EXTENDED RELEASE ORAL
Qty: 0 | Refills: 0 | Status: ACTIVE | COMMUNITY
Start: 2017-09-06

## 2023-08-09 NOTE — HPI-TODAY'S VISIT:
Patient is a 74 yo woman who presents for transition of care. She has established NAFLD Cirrhosis since 2017 based on liver biopsy, imaging and lab profile. She has compensated disease without portal hypertension. She denies signs/symptoms of advanced liver disease. She has DM, HTN, HLD. Obesity and the metabolic syndrome. Also has CKD-III. MRI Aug 2020 confirms cirrhosis without HCC or PVT. Last MELD was 9. She has never been evalauted for OLT due to age. No tobacco or alcohol abuse. Last EGD was 2017 with no varices. Her last colonoscopy was done at [facility] around 5 years with no polyps removed. No family history of GI malignancies.  Follow Up 4/1/21: Pateint presents for routine follow up. No liver related complaints over past 6 months. She is euvolemic with no signs of declining liver function. 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 Schaffer. She is due for HCC Screen. LFT's normal at last check. DM is poorly controlled. She reports a last HBA1C was in 7% range as per range.  Follow Up 11/19/21: Patient presents for routine follow up. MELD noted at 14 on last draw (driven by Cr). No liver related complaints. Hb noted at 9.3. She was given iron by primary MD which improved it to 12.1. She appears euvolemic with no signs of GIB or PSE. Appetite and sleep are good. HCC screen negative April 2021.  Follow Up 6/24/22: Patient presents for routine follow up. No liver related complaints. No hospitalizations. MRI in Nov 2021 negative for HCC (AFP marginally high at 10). No signs of portal hypertension. She has lost 6 lbs with dietary adjustments.  Follow Up 2/22/23: Patient presents for routine follow up. No liver related complaints. She c/o heartburn at night time. She takes Famotidine 20 mg BID and has to need TUMS for breakthrough symptoms two nights a week. Last EGD in 2017 showed a small hiatal hernia. Appetite is OK. No major issues with sleep. HCC screen negative July 2022.  Follow Up 8/9/23: Patient presents for routine follow up. EGD showed traced EV's and colon revealed 5 small TA's and diverticulosis in June 2023. Seh remains asymptomatic at this time. HCC screen negative March 2023. She has DM and CKD-3.

## 2023-08-10 ENCOUNTER — LAB OUTSIDE AN ENCOUNTER (OUTPATIENT)
Dept: URBAN - METROPOLITAN AREA CLINIC 54 | Facility: CLINIC | Age: 78
End: 2023-08-10

## 2023-08-10 ENCOUNTER — TELEPHONE ENCOUNTER (OUTPATIENT)
Dept: URBAN - METROPOLITAN AREA CLINIC 54 | Facility: CLINIC | Age: 78
End: 2023-08-10

## 2023-08-10 LAB
A/G RATIO: 2
ABSOLUTE BASOPHILS: 52
ABSOLUTE EOSINOPHILS: 130
ABSOLUTE LYMPHOCYTES: 1773
ABSOLUTE MONOCYTES: 390
ABSOLUTE NEUTROPHILS: 2855
AFP, SERUM, TUMOR MARKER: 8.4
ALBUMIN: 4.3
ALKALINE PHOSPHATASE: 85
ALT (SGPT): 21
AST (SGOT): 34
BASOPHILS: 1
BILIRUBIN, TOTAL: 0.7
BUN/CREATININE RATIO: 11
BUN: 18
CALCIUM: 9.2
CARBON DIOXIDE, TOTAL: 24
CHLORIDE: 107
CREATININE: 1.57
EGFR: 33
EOSINOPHILS: 2.5
GLOBULIN, TOTAL: 2.2
GLUCOSE: 122
HEMATOCRIT: 39
HEMOGLOBIN: 12.7
INR: 1
LYMPHOCYTES: 34.1
MCH: 28.1
MCHC: 32.6
MCV: 86.3
MONOCYTES: 7.5
MPV: 12.5
NEUTROPHILS: 54.9
PLATELET COUNT: 150
POTASSIUM: 4.5
PROTEIN, TOTAL: 6.5
PT: 11
RDW: 14.5
RED BLOOD CELL COUNT: 4.52
SODIUM: 140
WHITE BLOOD CELL COUNT: 5.2

## 2024-06-12 ENCOUNTER — LAB OUTSIDE AN ENCOUNTER (OUTPATIENT)
Dept: URBAN - NONMETROPOLITAN AREA CLINIC 4 | Facility: CLINIC | Age: 79
End: 2024-06-12

## 2024-06-12 ENCOUNTER — OFFICE VISIT (OUTPATIENT)
Dept: URBAN - NONMETROPOLITAN AREA CLINIC 4 | Facility: CLINIC | Age: 79
End: 2024-06-12
Payer: MEDICARE

## 2024-06-12 VITALS
HEIGHT: 64 IN | HEART RATE: 65 BPM | SYSTOLIC BLOOD PRESSURE: 142 MMHG | DIASTOLIC BLOOD PRESSURE: 64 MMHG | TEMPERATURE: 98.2 F | WEIGHT: 174 LBS | BODY MASS INDEX: 29.71 KG/M2

## 2024-06-12 DIAGNOSIS — K57.92 DIVERTICULITIS: ICD-10-CM

## 2024-06-12 DIAGNOSIS — R10.84 GENERALIZED ABDOMINAL PAIN: ICD-10-CM

## 2024-06-12 DIAGNOSIS — K74.60: ICD-10-CM

## 2024-06-12 DIAGNOSIS — K75.81 NASH (NONALCOHOLIC STEATOHEPATITIS): ICD-10-CM

## 2024-06-12 PROBLEM — 307496006: Status: ACTIVE | Noted: 2024-06-12

## 2024-06-12 PROCEDURE — 99214 OFFICE O/P EST MOD 30 MIN: CPT | Performed by: INTERNAL MEDICINE

## 2024-06-12 RX ORDER — CLONIDINE HYDROCHLORIDE 0.1 MG/1
1 TABLET TABLET ORAL BID
Qty: 60 TABLET | Status: ACTIVE | COMMUNITY
Start: 2021-04-01

## 2024-06-12 RX ORDER — ROSUVASTATIN CALCIUM 40 MG/1
1 TABLET TABLET, COATED ORAL ONCE A DAY
Status: ACTIVE | COMMUNITY

## 2024-06-12 RX ORDER — HYDRALAZINE HYDROCHLORIDE 25 MG/1
1 TABLET WITH FOOD TABLET, FILM COATED ORAL THREE TIMES A DAY
Status: ACTIVE | COMMUNITY

## 2024-06-12 RX ORDER — DICYCLOMINE HYDROCHLORIDE 20 MG/1
1 TABLET TABLET ORAL ONCE A DAY
Qty: 30 TABLET | Refills: 0 | OUTPATIENT
Start: 2024-06-12 | End: 2024-07-12

## 2024-06-12 RX ORDER — METOPROLOL SUCCINATE 50 MG/1
1 TABLET TABLET, FILM COATED, EXTENDED RELEASE ORAL ONCE A DAY
Refills: 0 | Status: ACTIVE | COMMUNITY
Start: 2017-09-06

## 2024-06-12 RX ORDER — AMLODIPINE BESYLATE 10 MG/1
1 TABLET TABLET ORAL ONCE A DAY
Status: ACTIVE | COMMUNITY

## 2024-06-12 RX ORDER — FAMOTIDINE 40 MG/1
1 TABLET AT BEDTIME AS NEEDED TABLET, FILM COATED ORAL ONCE A DAY
Status: ACTIVE | COMMUNITY

## 2024-06-12 NOTE — HPI-TODAY'S VISIT:
Medication: montelukast (SINGULAIR) 10 MG tablet  passed protocol.   Last office visit date: 2/22/24  Next appointment scheduled?: Yes   Number of refills given: 1   Patient is a 74 yo woman who presents for transition of care. She has established NAFLD Cirrhosis since 2017 based on liver biopsy, imaging and lab profile. She has compensated disease without portal hypertension. She denies signs/symptoms of advanced liver disease. She has DM, HTN, HLD. Obesity and the metabolic syndrome. Also has CKD-III. MRI Aug 2020 confirms cirrhosis without HCC or PVT. Last MELD was 9. She has never been evalauted for OLT due to age. No tobacco or alcohol abuse. Last EGD was 2017 with no varices. Her last colonoscopy was done at [facility] around 5 years with no polyps removed. No family history of GI malignancies.  Follow Up 4/1/21: Pateint presents for routine follow up. No liver related complaints over past 6 months. She is euvolemic with no signs of declining liver function. 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 Schaffer. She is due for HCC Screen. LFT's normal at last check. DM is poorly controlled. She reports a last HBA1C was in 7% range as per range.  Follow Up 11/19/21: Patient presents for routine follow up. MELD noted at 14 on last draw (driven by Cr). No liver related complaints. Hb noted at 9.3. She was given iron by primary MD which improved it to 12.1. She appears euvolemic with no signs of GIB or PSE. Appetite and sleep are good. HCC screen negative April 2021.  Follow Up 6/24/22: Patient presents for routine follow up. No liver related complaints. No hospitalizations. MRI in Nov 2021 negative for HCC (AFP marginally high at 10). No signs of portal hypertension. She has lost 6 lbs with dietary adjustments.  Follow Up 2/22/23: Patient presents for routine follow up. No liver related complaints. She c/o heartburn at night time. She takes Famotidine 20 mg BID and has to need TUMS for breakthrough symptoms two nights a week. Last EGD in 2017 showed a small hiatal hernia. Appetite is OK. No major issues with sleep. HCC screen negative July 2022.  Follow Up 8/9/23: Patient presents for routine follow up. EGD showed traced EV's and colon revealed 5 small TA's and diverticulosis in June 2023. She remains asymptomatic at this time. HCC screen negative March 2023. She has DM and CKD-3.  Follow Up 6/12/24: Patient presents for routine follow up. No liver related complaints. She c/o mild LLQ and right flank pain x 1 months. Both pains are intermittent and last last seconds to minutes. No diarrhea or constipation. She denies rectal bleeding or abnormal weight loss. She had uncomplicated diverticulitis in March 2023. HCC screen was negative in Sep 2023. LFTs normal in Aug 2023. show P0

## 2024-06-13 LAB
A/G RATIO: 1.7
ABSOLUTE BASOPHILS: 29
ABSOLUTE EOSINOPHILS: 98
ABSOLUTE LYMPHOCYTES: 1303
ABSOLUTE MONOCYTES: 412
ABSOLUTE NEUTROPHILS: 3058
AFP, SERUM, TUMOR MARKER: 9.7
ALBUMIN: 4.1
ALKALINE PHOSPHATASE: 85
ALT (SGPT): 23
AST (SGOT): 32
BASOPHILS: 0.6
BILIRUBIN, TOTAL: 0.7
BUN/CREATININE RATIO: 13
BUN: 19
CALCIUM: 8.6
CARBON DIOXIDE, TOTAL: 23
CHLORIDE: 108
CREATININE: 1.44
EGFR: 37
EOSINOPHILS: 2
GLOBULIN, TOTAL: 2.4
GLUCOSE: 134
HEMATOCRIT: 37
HEMOGLOBIN: 11.8
INR: 1
LYMPHOCYTES: 26.6
MCH: 27.7
MCHC: 31.9
MCV: 86.9
MONOCYTES: 8.4
MPV: 13.1
NEUTROPHILS: 62.4
PLATELET COUNT: 123
POTASSIUM: 4.2
PROTEIN, TOTAL: 6.5
PT: 10.9
RDW: 13.8
RED BLOOD CELL COUNT: 4.26
SODIUM: 140
WHITE BLOOD CELL COUNT: 4.9

## 2024-07-24 ENCOUNTER — TELEPHONE ENCOUNTER (OUTPATIENT)
Dept: URBAN - METROPOLITAN AREA CLINIC 54 | Facility: CLINIC | Age: 79
End: 2024-07-24

## 2024-12-18 ENCOUNTER — LAB OUTSIDE AN ENCOUNTER (OUTPATIENT)
Dept: URBAN - NONMETROPOLITAN AREA CLINIC 4 | Facility: CLINIC | Age: 79
End: 2024-12-18

## 2024-12-18 ENCOUNTER — OFFICE VISIT (OUTPATIENT)
Dept: URBAN - NONMETROPOLITAN AREA CLINIC 4 | Facility: CLINIC | Age: 79
End: 2024-12-18
Payer: MEDICARE

## 2024-12-18 VITALS
BODY MASS INDEX: 29.88 KG/M2 | HEART RATE: 62 BPM | DIASTOLIC BLOOD PRESSURE: 83 MMHG | HEIGHT: 64 IN | SYSTOLIC BLOOD PRESSURE: 138 MMHG | WEIGHT: 175 LBS | TEMPERATURE: 97.9 F

## 2024-12-18 DIAGNOSIS — K75.81 NASH (NONALCOHOLIC STEATOHEPATITIS): ICD-10-CM

## 2024-12-18 DIAGNOSIS — D18.03 HEMANGIOMA OF LIVER: ICD-10-CM

## 2024-12-18 DIAGNOSIS — I85.00 ESOPHAGEAL VARICES DETERMINED BY ENDOSCOPY: ICD-10-CM

## 2024-12-18 DIAGNOSIS — Z12.11 ENCOUNTER FOR SCREENING COLONOSCOPY: ICD-10-CM

## 2024-12-18 DIAGNOSIS — Z86.0100 PERSONAL HISTORY OF COLONIC POLYPS: ICD-10-CM

## 2024-12-18 DIAGNOSIS — K74.60: ICD-10-CM

## 2024-12-18 DIAGNOSIS — K86.2 PANCREATIC CYST: ICD-10-CM

## 2024-12-18 DIAGNOSIS — E66.811 CLASS 1 OBESITY: ICD-10-CM

## 2024-12-18 DIAGNOSIS — R10.84 GENERALIZED ABDOMINAL PAIN: ICD-10-CM

## 2024-12-18 DIAGNOSIS — E88.810 METABOLIC SYNDROME: ICD-10-CM

## 2024-12-18 DIAGNOSIS — D64.9 ANEMIA, UNSPECIFIED TYPE: ICD-10-CM

## 2024-12-18 DIAGNOSIS — K57.92 DIVERTICULITIS: ICD-10-CM

## 2024-12-18 DIAGNOSIS — N18.9 CKD (CHRONIC KIDNEY DISEASE): ICD-10-CM

## 2024-12-18 PROCEDURE — 99214 OFFICE O/P EST MOD 30 MIN: CPT | Performed by: INTERNAL MEDICINE

## 2024-12-18 RX ORDER — CLONIDINE HYDROCHLORIDE 0.1 MG/1
1 TABLET TABLET ORAL BID
Qty: 60 TABLET | Status: ACTIVE | COMMUNITY
Start: 2021-04-01

## 2024-12-18 RX ORDER — FAMOTIDINE 40 MG/1
1 TABLET AT BEDTIME AS NEEDED TABLET, FILM COATED ORAL ONCE A DAY
Status: ACTIVE | COMMUNITY

## 2024-12-18 RX ORDER — METOPROLOL SUCCINATE 50 MG/1
1 TABLET TABLET, FILM COATED, EXTENDED RELEASE ORAL ONCE A DAY
Refills: 0 | Status: ACTIVE | COMMUNITY
Start: 2017-09-06

## 2024-12-18 RX ORDER — ROSUVASTATIN CALCIUM 40 MG/1
1 TABLET TABLET, COATED ORAL ONCE A DAY
Status: ACTIVE | COMMUNITY

## 2024-12-18 RX ORDER — AMLODIPINE BESYLATE 10 MG/1
1 TABLET TABLET ORAL ONCE A DAY
Status: ACTIVE | COMMUNITY

## 2024-12-18 RX ORDER — HYDRALAZINE HYDROCHLORIDE 25 MG/1
1 TABLET WITH FOOD TABLET, FILM COATED ORAL THREE TIMES A DAY
Status: ACTIVE | COMMUNITY

## 2024-12-18 NOTE — HPI-TODAY'S VISIT:
Patient is a 76 yo woman who presents for transition of care. She has established NAFLD Cirrhosis since 2017 based on liver biopsy, imaging and lab profile. She has compensated disease without portal hypertension. She denies signs/symptoms of advanced liver disease. She has DM, HTN, HLD. Obesity and the metabolic syndrome. Also has CKD-III. MRI Aug 2020 confirms cirrhosis without HCC or PVT. Last MELD was 9. She has never been evalauted for OLT due to age. No tobacco or alcohol abuse. Last EGD was 2017 with no varices. Her last colonoscopy was done at [facility] around 5 years with no polyps removed. No family history of GI malignancies.  Follow Up 4/1/21: Pateint presents for routine follow up. No liver related complaints over past 6 months. She is euvolemic with no signs of declining liver function. 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 Schaffer. She is due for HCC Screen. LFT's normal at last check. DM is poorly controlled. She reports a last HBA1C was in 7% range as per range.  Follow Up 11/19/21: Patient presents for routine follow up. MELD noted at 14 on last draw (driven by Cr). No liver related complaints. Hb noted at 9.3. She was given iron by primary MD which improved it to 12.1. She appears euvolemic with no signs of GIB or PSE. Appetite and sleep are good. HCC screen negative April 2021.  Follow Up 6/24/22: Patient presents for routine follow up. No liver related complaints. No hospitalizations. MRI in Nov 2021 negative for HCC (AFP marginally high at 10). No signs of portal hypertension. She has lost 6 lbs with dietary adjustments.  Follow Up 2/22/23: Patient presents for routine follow up. No liver related complaints. She c/o heartburn at night time. She takes Famotidine 20 mg BID and has to need TUMS for breakthrough symptoms two nights a week. Last EGD in 2017 showed a small hiatal hernia. Appetite is OK. No major issues with sleep. HCC screen negative July 2022.  Follow Up 8/9/23: Patient presents for routine follow up. EGD showed traced EV's and colon revealed 5 small TA's and diverticulosis in June 2023. She remains asymptomatic at this time. HCC screen negative March 2023. She has DM and CKD-3.  Follow Up 6/12/24: Patient presents for routine follow up. No liver related complaints. She c/o mild LLQ and right flank pain x 1 months. Both pains are intermittent and last last seconds to minutes. No diarrhea or constipation. She denies rectal bleeding or abnormal weight loss. She had uncomplicated diverticulitis in March 2023. HCC screen was negative in Sep 2023. LFTs normal in Aug 2023.  Follow Up 12/18/24: Patient presents for routine follow up. No liver related complaints. LFTs on 12/16: AST 46, ALT 31, AP 91, TB 0.7. Cr 1.3. Abdominal pains have near resolved. MRI in July 2024 showed stable cirrhosis. No signs of portal hypertension.

## 2025-02-11 ENCOUNTER — TELEPHONE ENCOUNTER (OUTPATIENT)
Dept: URBAN - METROPOLITAN AREA CLINIC 5 | Facility: CLINIC | Age: 80
End: 2025-02-11

## 2025-06-25 ENCOUNTER — OFFICE VISIT (OUTPATIENT)
Dept: URBAN - NONMETROPOLITAN AREA CLINIC 4 | Facility: CLINIC | Age: 80
End: 2025-06-25